# Patient Record
Sex: FEMALE | Race: BLACK OR AFRICAN AMERICAN | NOT HISPANIC OR LATINO | Employment: FULL TIME | URBAN - METROPOLITAN AREA
[De-identification: names, ages, dates, MRNs, and addresses within clinical notes are randomized per-mention and may not be internally consistent; named-entity substitution may affect disease eponyms.]

---

## 2024-03-25 ENCOUNTER — HOSPITAL ENCOUNTER (EMERGENCY)
Facility: HOSPITAL | Age: 24
Discharge: HOME | End: 2024-03-25
Payer: COMMERCIAL

## 2024-03-25 VITALS
SYSTOLIC BLOOD PRESSURE: 102 MMHG | TEMPERATURE: 97.6 F | RESPIRATION RATE: 16 BRPM | HEART RATE: 71 BPM | WEIGHT: 180 LBS | DIASTOLIC BLOOD PRESSURE: 65 MMHG | HEIGHT: 66 IN | BODY MASS INDEX: 28.93 KG/M2 | OXYGEN SATURATION: 98 %

## 2024-03-25 DIAGNOSIS — L03.213 PRESEPTAL CELLULITIS OF LEFT LOWER EYELID: ICD-10-CM

## 2024-03-25 DIAGNOSIS — H10.32 ACUTE BACTERIAL CONJUNCTIVITIS OF LEFT EYE: Primary | ICD-10-CM

## 2024-03-25 PROCEDURE — 2500000001 HC RX 250 WO HCPCS SELF ADMINISTERED DRUGS (ALT 637 FOR MEDICARE OP)

## 2024-03-25 PROCEDURE — 99283 EMERGENCY DEPT VISIT LOW MDM: CPT

## 2024-03-25 RX ORDER — CIPROFLOXACIN HYDROCHLORIDE 3 MG/ML
2 SOLUTION/ DROPS OPHTHALMIC
Qty: 5 ML | Refills: 0 | Status: SHIPPED | OUTPATIENT
Start: 2024-03-25 | End: 2024-04-01

## 2024-03-25 RX ORDER — AMOXICILLIN AND CLAVULANATE POTASSIUM 875; 125 MG/1; MG/1
875 TABLET, FILM COATED ORAL 2 TIMES DAILY
Qty: 20 TABLET | Refills: 0 | Status: SHIPPED | OUTPATIENT
Start: 2024-03-25 | End: 2024-04-04

## 2024-03-25 RX ORDER — TETRACAINE HYDROCHLORIDE 5 MG/ML
1 SOLUTION OPHTHALMIC ONCE
Status: COMPLETED | OUTPATIENT
Start: 2024-03-25 | End: 2024-03-25

## 2024-03-25 RX ADMIN — TETRACAINE HYDROCHLORIDE 1 DROP: 5 SOLUTION OPHTHALMIC at 08:47

## 2024-03-25 RX ADMIN — FLUORESCEIN SODIUM 1 STRIP: 1 STRIP OPHTHALMIC at 08:47

## 2024-03-25 ASSESSMENT — PAIN - FUNCTIONAL ASSESSMENT: PAIN_FUNCTIONAL_ASSESSMENT: 0-10

## 2024-03-25 ASSESSMENT — COLUMBIA-SUICIDE SEVERITY RATING SCALE - C-SSRS
6. HAVE YOU EVER DONE ANYTHING, STARTED TO DO ANYTHING, OR PREPARED TO DO ANYTHING TO END YOUR LIFE?: NO
2. HAVE YOU ACTUALLY HAD ANY THOUGHTS OF KILLING YOURSELF?: NO
1. IN THE PAST MONTH, HAVE YOU WISHED YOU WERE DEAD OR WISHED YOU COULD GO TO SLEEP AND NOT WAKE UP?: NO

## 2024-03-25 ASSESSMENT — VISUAL ACUITY
OS: 20/30
OD: 20/10
OU: 20/15

## 2024-03-25 ASSESSMENT — PAIN SCALES - GENERAL: PAINLEVEL_OUTOF10: 4

## 2024-03-25 ASSESSMENT — PAIN DESCRIPTION - ORIENTATION: ORIENTATION: LEFT

## 2024-03-25 ASSESSMENT — PAIN DESCRIPTION - LOCATION: LOCATION: EYE

## 2024-03-25 NOTE — Clinical Note
Aravind Theodore was seen and treated in our emergency department on 3/25/2024.  She may return to work on 03/28/2024.       If you have any questions or concerns, please don't hesitate to call.      Magy Hunter PA-C

## 2024-03-25 NOTE — ED PROVIDER NOTES
HPI   Chief Complaint   Patient presents with    Eye Drainage     Pt. To ED for concern of left eye redness, drainage, and swelling. Was seen at urgent care and provided eye drops. Per pt. Drops have not been helping        HPI  HISTORY OF PRESENT ILLNESS:  23 y.o. female presenting to the ED with complaint of left eye redness, tearing, and discharge for the past 1 week.  She reports irritation and burning, but no significant pain.  Denies any known injury or trauma.  She has had crusting of the eyelashes when she wakes up in the morning, some purulent drainage, and excessive tearing.  She denies blurry vision.  She does wear contacts and glasses, but she has not worn her contacts and symptom onset.  She states the bottom eyelid is now swollen, but is not tender.  She has no pain with eye movements.  Has no fevers or chills.  Patient went to urgent care on Saturday, 2 days ago, and was prescribed ofloxacin eyedrops, which she states have not improved her symptoms.  Denies chest pain or shortness of breath, no neck pain or stiffness, no headache, no dizziness or lightheadedness, no abdominal pain, no nausea or vomiting.  Denies chance of pregnancy.  No other complaints or symptoms voiced    12 point review of systems was performed and is negative unless otherwise specified in HPI.    PMH: denies  Family history: noncontributory  Social history: non smoker, no ETOH, no illicit substances  Past Medical History:   Diagnosis Date    Other specified health status 01/25/2017    Known health problems: none         Abnormal Labs Reviewed - No data to display   No orders to display               Cal Coma Scale Score: 15                     Patient History   Past Medical History:   Diagnosis Date    Other specified health status 01/25/2017    Known health problems: none     Past Surgical History:   Procedure Laterality Date    CT ANGIO NECK W AND WO IV CONTRAST  11/2/2022    CT NECK ANGIO W AND WO IV CONTRAST 11/2/2022 [facility]  EMERGENCY [facility]GACY    OTHER SURGICAL HISTORY  01/25/2017    Prior Surgical Procedure Not Done     No family history on file.  Social History     Tobacco Use    Smoking status: Not on file    Smokeless tobacco: Not on file   Substance Use Topics    Alcohol use: Not on file    Drug use: Not on file       Physical Exam   ED Triage Vitals [03/25/24 0822]   Temperature Heart Rate Respirations BP   36.4 °C (97.6 °F) 75 15 99/64      Pulse Ox Temp src Heart Rate Source Patient Position   99 % -- -- --      BP Location FiO2 (%)     -- --       Physical Exam  Constitutional:       General: She is not in acute distress.     Appearance: She is not ill-appearing, toxic-appearing or diaphoretic.   HENT:      Head: Normocephalic and atraumatic.      Nose: No congestion.      Mouth/Throat:      Mouth: Mucous membranes are moist.   Eyes:      General: No scleral icterus.        Right eye: No discharge.         Left eye: Discharge present.     Extraocular Movements: Extraocular movements intact.      Pupils: Pupils are equal, round, and reactive to light.      Comments: Right eye WNL.  Left eye: +diffuse conjunctival injection, excessive tearing noted. No limbal injection, no purulent or crusting discharge.  +mild soft edema of lower eyelid, no erythema or warmth, nontender. No edema or erythema of the remainder of the periorbital area.  Extraocular movements intact and nonpainful. No evidence of preseptal or orbital cellulitis.  Pupils equal round and reactive.  Eyelids everted, with no evidence of foreign body noted, no stye or chalazion.  Globes equally firm bilaterally. No visual field deficits. No nystagmus. No direct or consensual photophobia. No proptosis.    Visual acuity 20/30 left eye, 20/15 right eye, 20/10 both    Fluorescein stain performed.  Irritation completely resolved after instillation of tetracaine drops.  After fluorescein instilled under Woods lamp, there are no areas of uptake, no Dendritic lesions, no corneal  abrasions or foreign bodies noted, negative Eber sign.    Cardiovascular:      Rate and Rhythm: Normal rate and regular rhythm.      Pulses: Normal pulses.   Pulmonary:      Effort: Pulmonary effort is normal. No respiratory distress.      Breath sounds: Normal breath sounds.   Abdominal:      Palpations: Abdomen is soft.   Musculoskeletal:         General: Normal range of motion.      Cervical back: Normal range of motion and neck supple. No rigidity.      Right lower leg: No edema.      Left lower leg: No edema.   Lymphadenopathy:      Cervical: No cervical adenopathy.   Skin:     General: Skin is warm and dry.      Capillary Refill: Capillary refill takes less than 2 seconds.   Neurological:      General: No focal deficit present.      Mental Status: She is alert and oriented to person, place, and time.      Cranial Nerves: No cranial nerve deficit.      Gait: Gait normal.   Psychiatric:         Mood and Affect: Mood normal.         Behavior: Behavior normal.         Judgment: Judgment normal.         ED Course & MDM   ED Course as of 03/25/24 0855   Mon Mar 25, 2024   0841 Visual acuity 20/30 left (affected) eye, 20/15 right eye, 20/10 both [EH]      ED Course User Index  [EH] Magy Hunter PA-C         Diagnoses as of 03/25/24 0855   Acute bacterial conjunctivitis of left eye   Preseptal cellulitis of left lower eyelid       Medical Decision Making  ED course / MDM     Summary:  Patient presented with left eye redness, tearing, and discharge for the past 1 week.  Has had 2 days of ofloxacin eyedrops which have not improved her symptoms.  No systemic symptoms.  Vital signs stable, patient is very well-appearing.  On exam, right eye is within normal limits, there is diffuse left conjunctival injection, excessive tearing noticed, no current discharge, there is soft edema of the lower eyelid, no erythema, warmth, induration, or tenderness over the eyelids or periorbital area.  No pain with extraocular  movements.  No evidence of orbital cellulitis.  Visual acuity is intact.  Fluorescein stain unremarkable.  Results and differential discussed in detail with the patient.  The small amount of lower eyelid edema may be from adjacent conjunctivitis, however will cover for a possible developing preseptal cellulitis with Augmentin.  Prescription was also sent for ciprofloxacin eyedrops.  She has only had drops for about 2 days, possible that she may need to just give the eyedrops more time to work.  She was advised to follow-up with ophthalmology, given referral.  Will also see her PCP. Patient was given strict return precautions, understands reasons to return to the ED. Also discussed supportive care instructions. I expressed the importance of outpatient follow up with their PCP. All questions were answered, patient expressed understanding and stated that they would comply.    Patient was advised to follow up with PCP or recommended provider in 2-3 days for another evaluation and exam. I advised patient and family/friend/caregiver/guardian to return or go to closest emergency room immediately if symptoms change, get worse, new symptoms develop prior to follow up. If there is no improvement in symptoms in the next 24 hours they are advised to return for further evaluation and exam. I also explained the plan and treatment course. Patient and family/friend/caregiver/guardian is in agreement with plan, treatment course, and follow up and states verbally that they will comply.    Impression:  1. See diagnosis    Plan: Homegoing. I discussed the differential, results, and discharge plan with the patient and family/friend/caregiver. I emphasized the importance of follow-up with the physician I referred them to in the timeframe recommended.  I explained reasons for the patient to return to the Emergency Department. They agreed that if they feel their condition is worsening or if they have any other concern they should call 911  immediately for further assistance. We also discussed medications that were prescribed including common side effects and interactions. The patient was advised to abstain from driving, operating heavy machinery, or making significant decisions while taking medications such as opiates and muscle relaxers that may impair this. I gave the patient an opportunity to ask all questions they had and answered all of them accordingly. They understand return precautions and discharge instructions. The patient and family/friend/caregiver expressed understanding verbally and that they would comply.       Disposition: Discharge    This note has been transcribed using voice recognition and may contain grammatical errors, misplaced words, incorrect words, incorrect phrases or other errors.   Procedure  Procedures     Magy Hunter PA-C  03/26/24 0994

## 2024-03-26 ENCOUNTER — APPOINTMENT (OUTPATIENT)
Dept: PRIMARY CARE | Facility: CLINIC | Age: 24
End: 2024-03-26
Payer: COMMERCIAL

## 2024-10-15 ENCOUNTER — LAB (OUTPATIENT)
Dept: LAB | Facility: LAB | Age: 24
End: 2024-10-15
Payer: COMMERCIAL

## 2024-10-15 ENCOUNTER — OFFICE VISIT (OUTPATIENT)
Dept: OBSTETRICS AND GYNECOLOGY | Facility: HOSPITAL | Age: 24
End: 2024-10-15
Payer: COMMERCIAL

## 2024-10-15 VITALS
WEIGHT: 199 LBS | BODY MASS INDEX: 31.98 KG/M2 | SYSTOLIC BLOOD PRESSURE: 120 MMHG | DIASTOLIC BLOOD PRESSURE: 75 MMHG | HEART RATE: 80 BPM | HEIGHT: 66 IN

## 2024-10-15 DIAGNOSIS — Z11.3 ROUTINE SCREENING FOR STI (SEXUALLY TRANSMITTED INFECTION): ICD-10-CM

## 2024-10-15 DIAGNOSIS — Z86.19 HISTORY OF PCR DNA POSITIVE FOR HSV2: ICD-10-CM

## 2024-10-15 DIAGNOSIS — Z01.419 WELL WOMAN EXAM: Primary | ICD-10-CM

## 2024-10-15 LAB
HBV SURFACE AG SERPL QL IA: NONREACTIVE
HCV AB SER QL: NONREACTIVE
HIV 1+2 AB+HIV1 P24 AG SERPL QL IA: NONREACTIVE
TREPONEMA PALLIDUM IGG+IGM AB [PRESENCE] IN SERUM OR PLASMA BY IMMUNOASSAY: NONREACTIVE

## 2024-10-15 PROCEDURE — 87661 TRICHOMONAS VAGINALIS AMPLIF: CPT

## 2024-10-15 PROCEDURE — 87205 SMEAR GRAM STAIN: CPT

## 2024-10-15 PROCEDURE — 1036F TOBACCO NON-USER: CPT

## 2024-10-15 PROCEDURE — 3008F BODY MASS INDEX DOCD: CPT

## 2024-10-15 PROCEDURE — 87491 CHLMYD TRACH DNA AMP PROBE: CPT

## 2024-10-15 PROCEDURE — 99385 PREV VISIT NEW AGE 18-39: CPT

## 2024-10-15 PROCEDURE — 99395 PREV VISIT EST AGE 18-39: CPT

## 2024-10-15 RX ORDER — VALACYCLOVIR HYDROCHLORIDE 500 MG/1
500 TABLET, FILM COATED ORAL DAILY
Qty: 90 TABLET | Refills: 3 | Status: SHIPPED | OUTPATIENT
Start: 2024-10-15 | End: 2025-10-15

## 2024-10-15 SDOH — ECONOMIC STABILITY: TRANSPORTATION INSECURITY
IN THE PAST 12 MONTHS, HAS LACK OF TRANSPORTATION KEPT YOU FROM MEETINGS, WORK, OR FROM GETTING THINGS NEEDED FOR DAILY LIVING?: NO

## 2024-10-15 SDOH — ECONOMIC STABILITY: FOOD INSECURITY: WITHIN THE PAST 12 MONTHS, THE FOOD YOU BOUGHT JUST DIDN'T LAST AND YOU DIDN'T HAVE MONEY TO GET MORE.: NEVER TRUE

## 2024-10-15 SDOH — ECONOMIC STABILITY: FOOD INSECURITY: WITHIN THE PAST 12 MONTHS, YOU WORRIED THAT YOUR FOOD WOULD RUN OUT BEFORE YOU GOT MONEY TO BUY MORE.: NEVER TRUE

## 2024-10-15 SDOH — ECONOMIC STABILITY: TRANSPORTATION INSECURITY
IN THE PAST 12 MONTHS, HAS THE LACK OF TRANSPORTATION KEPT YOU FROM MEDICAL APPOINTMENTS OR FROM GETTING MEDICATIONS?: NO

## 2024-10-15 ASSESSMENT — SOCIAL DETERMINANTS OF HEALTH (SDOH)
WITHIN THE LAST YEAR, HAVE YOU BEEN HUMILIATED OR EMOTIONALLY ABUSED IN OTHER WAYS BY YOUR PARTNER OR EX-PARTNER?: YES
WITHIN THE LAST YEAR, HAVE YOU BEEN KICKED, HIT, SLAPPED, OR OTHERWISE PHYSICALLY HURT BY YOUR PARTNER OR EX-PARTNER?: YES
WITHIN THE LAST YEAR, HAVE YOU BEEN HUMILIATED OR EMOTIONALLY ABUSED IN OTHER WAYS BY YOUR PARTNER OR EX-PARTNER?: YES
WITHIN THE LAST YEAR, HAVE YOU BEEN AFRAID OF YOUR PARTNER OR EX-PARTNER?: YES
WITHIN THE LAST YEAR, HAVE YOU BEEN AFRAID OF YOUR PARTNER OR EX-PARTNER?: YES
WITHIN THE LAST YEAR, HAVE TO BEEN RAPED OR FORCED TO HAVE ANY KIND OF SEXUAL ACTIVITY BY YOUR PARTNER OR EX-PARTNER?: YES
WITHIN THE LAST YEAR, HAVE TO BEEN RAPED OR FORCED TO HAVE ANY KIND OF SEXUAL ACTIVITY BY YOUR PARTNER OR EX-PARTNER?: NO
WITHIN THE LAST YEAR, HAVE YOU BEEN KICKED, HIT, SLAPPED, OR OTHERWISE PHYSICALLY HURT BY YOUR PARTNER OR EX-PARTNER?: YES

## 2024-10-15 ASSESSMENT — ENCOUNTER SYMPTOMS
OCCASIONAL FEELINGS OF UNSTEADINESS: 0
DEPRESSION: 0
LOSS OF SENSATION IN FEET: 0

## 2024-10-15 ASSESSMENT — PAIN SCALES - GENERAL: PAINLEVEL: 0-NO PAIN

## 2024-10-15 ASSESSMENT — PATIENT HEALTH QUESTIONNAIRE - PHQ9
SUM OF ALL RESPONSES TO PHQ9 QUESTIONS 1 & 2: 0
1. LITTLE INTEREST OR PLEASURE IN DOING THINGS: NOT AT ALL
2. FEELING DOWN, DEPRESSED OR HOPELESS: NOT AT ALL

## 2024-10-15 NOTE — PROGRESS NOTES
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 trachomatis / N. gonorrhoeae, Amplified    Vaginitis Gram Stain For Bacterial Vaginosis + Yeast    HIV 1/2 Antigen/Antibody Screen with Reflex to Confirmation    Hepatitis B Surface Antigen    Hepatitis C Antibody    Syphilis Screen with Reflex       Estela Petersen, APRN-CNM     Mp SIMONS Ewelina is a 24 y.o. female who is here for a routine exam. Periods are regular every 28-30 days, lasting 7 days. Dysmenorrhea:none. Cyclic symptoms include none. No intermenstrual bleeding, spotting, or discharge.    ROS      /75 (BP Location: Left arm, Patient Position: Lying, BP Cuff Size: Large adult long)   Pulse 80   Ht 1.676 m (5' 6\")   Wt 90.3 kg (199 lb)   LMP 09/08/2024 (Exact Date)   BMI 32.12 kg/m²     General:   Alert and oriented x 3   Heart:  Thyroid: Regular rate, rhythm  Euthyroid, normal shape and size   Lungs:  Breast: Clear to auscultation bilaterally  Symmetrical, no skin changes/nipple discharge, redness, tenderness, no masses palpated bilaterally   Abdomen: Soft, non tender   Vulva: EGBUS normal   Vagina: Pink, normal discharge   Cervix: No CMT   Uterus: Normal shape, size   Adnexa: NT bilaterally       Thank you for coming to see me for your visit today and most importantly thank you for having a preventative visit.  If lab work was sent, you will see your test result via [employer]  Any prescriptions will be sent electronically to your pharmacy listed    I look forward to seeing you next year for your health care needs.  Please remember:   Sleep is important!   Exercise such as walking for 20 minutes per day is beneficial to your " physical and mental health   Sleep is so important and should be a priority!   Healthy diets can be expensive -- if you every feel like you are struggling to afford healthy food, please let me know as we have a very good program called [employer] that is available to you   Decrease alcohol and tobacco    Be well!  Estela

## 2024-10-16 LAB
C TRACH RRNA SPEC QL NAA+PROBE: NEGATIVE
CLUE CELLS VAG LPF-#/AREA: PRESENT /[LPF]
N GONORRHOEA DNA SPEC QL PROBE+SIG AMP: NEGATIVE
NUGENT SCORE: 8
T VAGINALIS RRNA SPEC QL NAA+PROBE: NEGATIVE
YEAST VAG WET PREP-#/AREA: ABNORMAL

## 2024-10-18 DIAGNOSIS — N76.0 BACTERIAL VAGINOSIS: Primary | ICD-10-CM

## 2024-10-18 DIAGNOSIS — B96.89 BACTERIAL VAGINOSIS: Primary | ICD-10-CM

## 2024-10-18 RX ORDER — METRONIDAZOLE 500 MG/1
500 TABLET ORAL 2 TIMES DAILY
Qty: 14 TABLET | Refills: 0 | Status: SHIPPED | OUTPATIENT
Start: 2024-10-18 | End: 2024-10-25

## 2024-10-21 ENCOUNTER — TELEPHONE (OUTPATIENT)
Dept: OBSTETRICS AND GYNECOLOGY | Facility: HOSPITAL | Age: 24
End: 2024-10-21
Payer: COMMERCIAL

## 2024-10-22 NOTE — TELEPHONE ENCOUNTER
----- Message from Estela Trinity sent at 10/18/2024  7:51 PM EDT -----  Positive for BV  Metronidazole was sent to the pharmacy on file for her    Contacted patient to discuss lab results  Patient identified by name and date of birth.  Patient informed that she tested positive for Bacterial Vaginosis  Educated patient that BV is the result of an overgrowth of bacteria in vagina causing symptoms such as abnormal discharge and odor  Explained that treatment for BV is an oral antibiotic taken twice daily for 7 days  Medication sent to pharmacy  Patient verbalized understanding and all questions and concerns were answered.  Encouraged patient to call office with any questions or concerns   Lita Jurado RN

## 2025-01-22 DIAGNOSIS — N76.0 BACTERIAL VAGINOSIS: ICD-10-CM

## 2025-01-22 DIAGNOSIS — B96.89 BACTERIAL VAGINOSIS: ICD-10-CM

## 2025-01-23 RX ORDER — METRONIDAZOLE 500 MG/1
500 TABLET ORAL 2 TIMES DAILY
Qty: 14 TABLET | Refills: 0 | Status: SHIPPED | OUTPATIENT
Start: 2025-01-23 | End: 2025-01-30

## 2025-04-22 ENCOUNTER — OFFICE VISIT (OUTPATIENT)
Dept: OBSTETRICS AND GYNECOLOGY | Facility: HOSPITAL | Age: 25
End: 2025-04-22
Payer: COMMERCIAL

## 2025-04-22 VITALS
WEIGHT: 194 LBS | HEIGHT: 66 IN | HEART RATE: 120 BPM | SYSTOLIC BLOOD PRESSURE: 112 MMHG | DIASTOLIC BLOOD PRESSURE: 68 MMHG | BODY MASS INDEX: 31.18 KG/M2

## 2025-04-22 DIAGNOSIS — Z11.3 ROUTINE SCREENING FOR STI (SEXUALLY TRANSMITTED INFECTION): ICD-10-CM

## 2025-04-22 DIAGNOSIS — N93.9 ABNORMAL UTERINE BLEEDING (AUB): Primary | ICD-10-CM

## 2025-04-22 PROCEDURE — 99213 OFFICE O/P EST LOW 20 MIN: CPT

## 2025-04-22 PROCEDURE — 1036F TOBACCO NON-USER: CPT

## 2025-04-22 PROCEDURE — 3008F BODY MASS INDEX DOCD: CPT

## 2025-04-22 NOTE — PROGRESS NOTES
Subjective   Patient ID: Aravind Theodore is a 24 y.o. female who presents for STI Screening (Patient here for concerns of infertilty/Lmp 4/8/25/Pap 10/4/22).  Patient has been attempting pregnancy with her partner for more than 1 year with no success.  She also states she is beginning to have more irregular menses, sometimes skipping 1-1.5 months before menses will return.  Patient states that the diagnosis of PCOS has been brought up in the past, but she wasn't told how the provider came to that decision.  She is willing to repeat any labs and imaging that may have been done previously to further explore if PCOS is the firm diagnosis and where to go from there regarding her fertility.        Review of Systems   All other systems reviewed and are negative.      Objective   Physical Exam  Constitutional:       Appearance: Normal appearance.   HENT:      Head: Normocephalic and atraumatic.      Mouth/Throat:      Mouth: Mucous membranes are moist.   Pulmonary:      Effort: Pulmonary effort is normal.   Musculoskeletal:         General: Normal range of motion.      Cervical back: Normal range of motion.   Skin:     General: Skin is warm and dry.   Neurological:      Mental Status: She is alert and oriented to person, place, and time.   Psychiatric:         Mood and Affect: Mood normal.         Behavior: Behavior normal.         Thought Content: Thought content normal.         Judgment: Judgment normal.         Assessment/Plan   Problem List Items Addressed This Visit           ICD-10-CM    Routine screening for STI (sexually transmitted infection) Z11.3    Relevant Orders    C. trachomatis / N. gonorrhoeae, Amplified, Urogenital    Hepatitis BsAg    Hepatitis C Antibody    HIV 1/2 Antigen/Antibody Screen with Reflex to Confirmation    Syphilis Screen with Reflex    Trichomonas vaginalis, Amplified    Abnormal uterine bleeding (AUB) - Primary N93.9    Relevant Orders    US PELVIS TRANSABDOMINAL WITH TRANSVAGINAL     Testosterone,Free and Total    DHEA-Sulfate    Prolactin    TSH with reflex to Free T4 if abnormal    Progesterone    FSH & LH    Hemoglobin A1C            Estela Petersen, ELISE-CRESCENCIO 04/22/25 5:03 PM

## 2025-04-23 LAB
C TRACH RRNA SPEC QL NAA+PROBE: NOT DETECTED
N GONORRHOEA RRNA SPEC QL NAA+PROBE: NOT DETECTED
QUEST GC CT AMPLIFIED (ALWAYS MESSAGE): NORMAL
T VAGINALIS RRNA SPEC QL NAA+PROBE: NOT DETECTED

## 2025-04-26 LAB
DHEA-S SERPL-MCNC: 321 MCG/DL (ref 14–349)
EST. AVERAGE GLUCOSE BLD GHB EST-MCNC: 91 MG/DL
EST. AVERAGE GLUCOSE BLD GHB EST-SCNC: 5 MMOL/L
FSH SERPL-ACNC: 6.6 MIU/ML
HBA1C MFR BLD: 4.8 %
HBV SURFACE AG SERPL QL IA: NORMAL
HCV AB SERPL QL IA: NORMAL
HIV 1+2 AB+HIV1 P24 AG SERPL QL IA: NORMAL
LH SERPL-ACNC: 12.9 MIU/ML
PROGEST SERPL-MCNC: 1 NG/ML
PROLACTIN SERPL-MCNC: 4.9 NG/ML
T PALLIDUM AB SER QL IA: NEGATIVE
TESTOST FREE SERPL-MCNC: 6.1 PG/ML (ref 0.1–6.4)
TESTOST SERPL-MCNC: 58 NG/DL (ref 2–45)
TSH SERPL-ACNC: 0.79 MIU/L

## 2025-04-28 DIAGNOSIS — E28.2 PCOS (POLYCYSTIC OVARIAN SYNDROME): Primary | ICD-10-CM

## 2025-04-28 RX ORDER — THIAMINE HCL 100 MG
2000 CAPSULE ORAL DAILY
Qty: 120 CAPSULE | Refills: 11 | Status: SHIPPED | OUTPATIENT
Start: 2025-04-28 | End: 2026-04-28

## 2025-05-01 ENCOUNTER — HOSPITAL ENCOUNTER (OUTPATIENT)
Dept: RADIOLOGY | Facility: HOSPITAL | Age: 25
Discharge: HOME | End: 2025-05-01
Payer: COMMERCIAL

## 2025-05-01 DIAGNOSIS — N93.9 ABNORMAL UTERINE BLEEDING (AUB): ICD-10-CM

## 2025-05-15 ENCOUNTER — APPOINTMENT (OUTPATIENT)
Dept: DERMATOLOGY | Facility: CLINIC | Age: 25
End: 2025-05-15
Payer: COMMERCIAL

## 2025-06-05 DIAGNOSIS — E28.2 PCOS (POLYCYSTIC OVARIAN SYNDROME): ICD-10-CM

## 2025-06-10 RX ORDER — THIAMINE HCL 100 MG
CAPSULE ORAL
Qty: 120 CAPSULE | Refills: 11 | OUTPATIENT
Start: 2025-06-10

## 2025-06-10 NOTE — TELEPHONE ENCOUNTER
RN called patient. Patient name and  verified. Patient informed her pharmacy is unable to fill the order written by Estela for inositoL 500 mg capsule (Take 2,000 mg by mouth once daily). It will have to be purchased over the counter at a drug store or online. Patient stated she knew and was already looking into purchasing online. Patient educated that the dose is 2000 mg once daily. Even the order estela wrote was for 500 mg capsule and 4 needed to be taken once daily. Patient verbalized understanding. All questions and concerns addressed.  NAN Thao